# Patient Record
Sex: FEMALE | Race: WHITE | NOT HISPANIC OR LATINO | Employment: OTHER | URBAN - NONMETROPOLITAN AREA
[De-identification: names, ages, dates, MRNs, and addresses within clinical notes are randomized per-mention and may not be internally consistent; named-entity substitution may affect disease eponyms.]

---

## 2024-03-08 ENCOUNTER — HOSPITAL ENCOUNTER (INPATIENT)
Facility: HOSPITAL | Age: 29
LOS: 1 days | Discharge: HOME | End: 2024-03-09
Attending: OBSTETRICS & GYNECOLOGY | Admitting: OBSTETRICS & GYNECOLOGY
Payer: COMMERCIAL

## 2024-03-08 PROBLEM — Z36.89 ENCOUNTER FOR TRIAGE IN PREGNANT PATIENT: Status: ACTIVE | Noted: 2024-03-08

## 2024-03-08 LAB
ABO/RH(D): NORMAL
ABO/RH(D): NORMAL
ANTIBODY SCREEN: NEGATIVE
ERYTHROCYTE [DISTWIDTH] IN BLOOD BY AUTOMATED COUNT: 14.2 % (ref 10–15)
FETAL BLEED SCREEN: NORMAL
HCT VFR BLD AUTO: 30.7 % (ref 35–47)
HGB BLD-MCNC: 10.3 G/DL (ref 11.7–15.7)
HOLD SPECIMEN: NORMAL
MCH RBC QN AUTO: 28.8 PG (ref 26.5–33)
MCHC RBC AUTO-ENTMCNC: 33.6 G/DL (ref 31.5–36.5)
MCV RBC AUTO: 86 FL (ref 78–100)
PLATELET # BLD AUTO: 184 10E3/UL (ref 150–450)
RBC # BLD AUTO: 3.58 10E6/UL (ref 3.8–5.2)
SPECIMEN EXPIRATION DATE: NORMAL
SPECIMEN EXPIRATION DATE: NORMAL
WBC # BLD AUTO: 17.6 10E3/UL (ref 4–11)

## 2024-03-08 PROCEDURE — 86900 BLOOD TYPING SEROLOGIC ABO: CPT | Performed by: OBSTETRICS & GYNECOLOGY

## 2024-03-08 PROCEDURE — 258N000003 HC RX IP 258 OP 636: Performed by: OBSTETRICS & GYNECOLOGY

## 2024-03-08 PROCEDURE — 250N000011 HC RX IP 250 OP 636: Mod: JZ | Performed by: OBSTETRICS & GYNECOLOGY

## 2024-03-08 PROCEDURE — 250N000009 HC RX 250: Performed by: OBSTETRICS & GYNECOLOGY

## 2024-03-08 PROCEDURE — 85461 HEMOGLOBIN FETAL: CPT | Performed by: OBSTETRICS & GYNECOLOGY

## 2024-03-08 PROCEDURE — 85018 HEMOGLOBIN: CPT | Performed by: OBSTETRICS & GYNECOLOGY

## 2024-03-08 PROCEDURE — 99222 1ST HOSP IP/OBS MODERATE 55: CPT | Performed by: OBSTETRICS & GYNECOLOGY

## 2024-03-08 PROCEDURE — 36415 COLL VENOUS BLD VENIPUNCTURE: CPT | Performed by: OBSTETRICS & GYNECOLOGY

## 2024-03-08 RX ORDER — TRANEXAMIC ACID 10 MG/ML
1 INJECTION, SOLUTION INTRAVENOUS ONCE
Status: COMPLETED | OUTPATIENT
Start: 2024-03-08 | End: 2024-03-08

## 2024-03-08 RX ORDER — LIDOCAINE 40 MG/G
CREAM TOPICAL
Status: DISCONTINUED | OUTPATIENT
Start: 2024-03-08 | End: 2024-03-09 | Stop reason: HOSPADM

## 2024-03-08 RX ADMIN — SODIUM CHLORIDE, POTASSIUM CHLORIDE, SODIUM LACTATE AND CALCIUM CHLORIDE 1000 ML: 600; 310; 30; 20 INJECTION, SOLUTION INTRAVENOUS at 22:00

## 2024-03-08 RX ADMIN — TRANEXAMIC ACID 1 G: 10 INJECTION, SOLUTION INTRAVENOUS at 20:55

## 2024-03-08 RX ADMIN — HUMAN RHO(D) IMMUNE GLOBULIN 300 MCG: 1500 SOLUTION INTRAMUSCULAR; INTRAVENOUS at 23:39

## 2024-03-08 ASSESSMENT — ACTIVITIES OF DAILY LIVING (ADL)
ADLS_ACUITY_SCORE: 18
ADLS_ACUITY_SCORE: 21
ADLS_ACUITY_SCORE: 18

## 2024-03-09 ENCOUNTER — HOSPITAL ENCOUNTER (INPATIENT)
Facility: HOSPITAL | Age: 29
End: 2024-03-09
Admitting: OBSTETRICS & GYNECOLOGY
Payer: COMMERCIAL

## 2024-03-09 VITALS
HEIGHT: 67 IN | BODY MASS INDEX: 39.05 KG/M2 | HEART RATE: 91 BPM | WEIGHT: 248.8 LBS | SYSTOLIC BLOOD PRESSURE: 117 MMHG | OXYGEN SATURATION: 99 % | DIASTOLIC BLOOD PRESSURE: 64 MMHG | RESPIRATION RATE: 20 BRPM | TEMPERATURE: 98.3 F

## 2024-03-09 LAB
BASOPHILS # BLD AUTO: 0 10E3/UL (ref 0–0.2)
BASOPHILS NFR BLD AUTO: 0 %
EOSINOPHIL # BLD AUTO: 0 10E3/UL (ref 0–0.7)
EOSINOPHIL NFR BLD AUTO: 0 %
ERYTHROCYTE [DISTWIDTH] IN BLOOD BY AUTOMATED COUNT: 14.3 % (ref 10–15)
EST. AVERAGE GLUCOSE BLD GHB EST-MCNC: 105 MG/DL
HBA1C MFR BLD: 5.3 %
HBV SURFACE AG SERPL QL IA: NONREACTIVE
HCT VFR BLD AUTO: 25.2 % (ref 35–47)
HGB BLD-MCNC: 8.5 G/DL (ref 11.7–15.7)
HIV 1+2 AB+HIV1 P24 AG SERPL QL IA: NONREACTIVE
IMM GRANULOCYTES # BLD: 0.1 10E3/UL
IMM GRANULOCYTES NFR BLD: 1 %
LYMPHOCYTES # BLD AUTO: 3.6 10E3/UL (ref 0–5.3)
LYMPHOCYTES NFR BLD AUTO: 24 %
MCH RBC QN AUTO: 29 PG (ref 26.5–33)
MCHC RBC AUTO-ENTMCNC: 33.7 G/DL (ref 31.5–36.5)
MCV RBC AUTO: 86 FL (ref 78–100)
MONOCYTES # BLD AUTO: 1.4 10E3/UL (ref 0–1.3)
MONOCYTES NFR BLD AUTO: 10 %
NEUTROPHILS # BLD AUTO: 9.7 10E3/UL (ref 1.6–8.3)
NEUTROPHILS NFR BLD AUTO: 65 %
NRBC # BLD AUTO: 0 10E3/UL
NRBC BLD AUTO-RTO: 0 /100
PLATELET # BLD AUTO: 142 10E3/UL (ref 150–450)
RBC # BLD AUTO: 2.93 10E6/UL (ref 3.8–5.2)
T PALLIDUM AB SER QL: NONREACTIVE
WBC # BLD AUTO: 15 10E3/UL (ref 4–11)

## 2024-03-09 PROCEDURE — 85048 AUTOMATED LEUKOCYTE COUNT: CPT | Performed by: OBSTETRICS & GYNECOLOGY

## 2024-03-09 PROCEDURE — 250N000011 HC RX IP 250 OP 636: Performed by: OBSTETRICS & GYNECOLOGY

## 2024-03-09 PROCEDURE — 99238 HOSP IP/OBS DSCHRG MGMT 30/<: CPT | Performed by: OBSTETRICS & GYNECOLOGY

## 2024-03-09 PROCEDURE — 258N000003 HC RX IP 258 OP 636: Performed by: OBSTETRICS & GYNECOLOGY

## 2024-03-09 PROCEDURE — 83036 HEMOGLOBIN GLYCOSYLATED A1C: CPT | Performed by: OBSTETRICS & GYNECOLOGY

## 2024-03-09 PROCEDURE — 120N000001 HC R&B MED SURG/OB

## 2024-03-09 PROCEDURE — 87340 HEPATITIS B SURFACE AG IA: CPT | Performed by: OBSTETRICS & GYNECOLOGY

## 2024-03-09 PROCEDURE — 86762 RUBELLA ANTIBODY: CPT | Performed by: OBSTETRICS & GYNECOLOGY

## 2024-03-09 PROCEDURE — 87389 HIV-1 AG W/HIV-1&-2 AB AG IA: CPT | Performed by: OBSTETRICS & GYNECOLOGY

## 2024-03-09 PROCEDURE — 86780 TREPONEMA PALLIDUM: CPT | Performed by: OBSTETRICS & GYNECOLOGY

## 2024-03-09 PROCEDURE — 36415 COLL VENOUS BLD VENIPUNCTURE: CPT | Performed by: OBSTETRICS & GYNECOLOGY

## 2024-03-09 RX ORDER — DIPHENHYDRAMINE HYDROCHLORIDE 50 MG/ML
50 INJECTION INTRAMUSCULAR; INTRAVENOUS
Status: DISCONTINUED | OUTPATIENT
Start: 2024-03-09 | End: 2024-03-09 | Stop reason: HOSPADM

## 2024-03-09 RX ORDER — OXYTOCIN/0.9 % SODIUM CHLORIDE 30/500 ML
340 PLASTIC BAG, INJECTION (ML) INTRAVENOUS CONTINUOUS PRN
Status: DISCONTINUED | OUTPATIENT
Start: 2024-03-09 | End: 2024-03-09 | Stop reason: HOSPADM

## 2024-03-09 RX ORDER — ACETAMINOPHEN 325 MG/1
650 TABLET ORAL EVERY 4 HOURS PRN
Status: DISCONTINUED | OUTPATIENT
Start: 2024-03-09 | End: 2024-03-09 | Stop reason: HOSPADM

## 2024-03-09 RX ORDER — METOCLOPRAMIDE HYDROCHLORIDE 5 MG/ML
10 INJECTION INTRAMUSCULAR; INTRAVENOUS EVERY 6 HOURS PRN
Status: DISCONTINUED | OUTPATIENT
Start: 2024-03-09 | End: 2024-03-09 | Stop reason: HOSPADM

## 2024-03-09 RX ORDER — PROCHLORPERAZINE MALEATE 10 MG
10 TABLET ORAL EVERY 6 HOURS PRN
Status: DISCONTINUED | OUTPATIENT
Start: 2024-03-09 | End: 2024-03-09 | Stop reason: HOSPADM

## 2024-03-09 RX ORDER — ONDANSETRON 2 MG/ML
4 INJECTION INTRAMUSCULAR; INTRAVENOUS EVERY 6 HOURS PRN
Status: DISCONTINUED | OUTPATIENT
Start: 2024-03-09 | End: 2024-03-09 | Stop reason: HOSPADM

## 2024-03-09 RX ORDER — FENTANYL CITRATE 50 UG/ML
50 INJECTION, SOLUTION INTRAMUSCULAR; INTRAVENOUS EVERY 30 MIN PRN
Status: DISCONTINUED | OUTPATIENT
Start: 2024-03-09 | End: 2024-03-09 | Stop reason: HOSPADM

## 2024-03-09 RX ORDER — METHYLERGONOVINE MALEATE 0.2 MG/ML
200 INJECTION INTRAVENOUS
Status: DISCONTINUED | OUTPATIENT
Start: 2024-03-09 | End: 2024-03-09 | Stop reason: HOSPADM

## 2024-03-09 RX ORDER — METHYLPREDNISOLONE SODIUM SUCCINATE 125 MG/2ML
125 INJECTION, POWDER, LYOPHILIZED, FOR SOLUTION INTRAMUSCULAR; INTRAVENOUS
Status: DISCONTINUED | OUTPATIENT
Start: 2024-03-09 | End: 2024-03-09 | Stop reason: HOSPADM

## 2024-03-09 RX ORDER — TRANEXAMIC ACID 10 MG/ML
1 INJECTION, SOLUTION INTRAVENOUS EVERY 30 MIN PRN
Status: DISCONTINUED | OUTPATIENT
Start: 2024-03-09 | End: 2024-03-09 | Stop reason: HOSPADM

## 2024-03-09 RX ORDER — LOPERAMIDE HCL 2 MG
4 CAPSULE ORAL
Status: DISCONTINUED | OUTPATIENT
Start: 2024-03-09 | End: 2024-03-09 | Stop reason: HOSPADM

## 2024-03-09 RX ORDER — NALOXONE HYDROCHLORIDE 0.4 MG/ML
0.2 INJECTION, SOLUTION INTRAMUSCULAR; INTRAVENOUS; SUBCUTANEOUS
Status: DISCONTINUED | OUTPATIENT
Start: 2024-03-09 | End: 2024-03-09 | Stop reason: HOSPADM

## 2024-03-09 RX ORDER — LOPERAMIDE HCL 2 MG
2 CAPSULE ORAL
Status: DISCONTINUED | OUTPATIENT
Start: 2024-03-09 | End: 2024-03-09 | Stop reason: HOSPADM

## 2024-03-09 RX ORDER — NALOXONE HYDROCHLORIDE 0.4 MG/ML
0.4 INJECTION, SOLUTION INTRAMUSCULAR; INTRAVENOUS; SUBCUTANEOUS
Status: DISCONTINUED | OUTPATIENT
Start: 2024-03-09 | End: 2024-03-09 | Stop reason: HOSPADM

## 2024-03-09 RX ORDER — KETOROLAC TROMETHAMINE 30 MG/ML
30 INJECTION, SOLUTION INTRAMUSCULAR; INTRAVENOUS
Status: DISCONTINUED | OUTPATIENT
Start: 2024-03-09 | End: 2024-03-09 | Stop reason: HOSPADM

## 2024-03-09 RX ORDER — CARBOPROST TROMETHAMINE 250 UG/ML
250 INJECTION, SOLUTION INTRAMUSCULAR
Status: DISCONTINUED | OUTPATIENT
Start: 2024-03-09 | End: 2024-03-09 | Stop reason: HOSPADM

## 2024-03-09 RX ORDER — OXYTOCIN 10 [USP'U]/ML
10 INJECTION, SOLUTION INTRAMUSCULAR; INTRAVENOUS
Status: DISCONTINUED | OUTPATIENT
Start: 2024-03-09 | End: 2024-03-09 | Stop reason: HOSPADM

## 2024-03-09 RX ORDER — IBUPROFEN 800 MG/1
800 TABLET, FILM COATED ORAL
Status: DISCONTINUED | OUTPATIENT
Start: 2024-03-09 | End: 2024-03-09 | Stop reason: HOSPADM

## 2024-03-09 RX ORDER — ONDANSETRON 4 MG/1
4 TABLET, ORALLY DISINTEGRATING ORAL EVERY 6 HOURS PRN
Status: DISCONTINUED | OUTPATIENT
Start: 2024-03-09 | End: 2024-03-09 | Stop reason: HOSPADM

## 2024-03-09 RX ORDER — METOCLOPRAMIDE 10 MG/1
10 TABLET ORAL EVERY 6 HOURS PRN
Status: DISCONTINUED | OUTPATIENT
Start: 2024-03-09 | End: 2024-03-09 | Stop reason: HOSPADM

## 2024-03-09 RX ORDER — PROCHLORPERAZINE 25 MG
25 SUPPOSITORY, RECTAL RECTAL EVERY 12 HOURS PRN
Status: DISCONTINUED | OUTPATIENT
Start: 2024-03-09 | End: 2024-03-09 | Stop reason: HOSPADM

## 2024-03-09 RX ORDER — MISOPROSTOL 200 UG/1
400 TABLET ORAL
Status: DISCONTINUED | OUTPATIENT
Start: 2024-03-09 | End: 2024-03-09 | Stop reason: HOSPADM

## 2024-03-09 RX ADMIN — IRON SUCROSE 300 MG: 20 INJECTION, SOLUTION INTRAVENOUS at 08:39

## 2024-03-09 ASSESSMENT — ACTIVITIES OF DAILY LIVING (ADL)
ADLS_ACUITY_SCORE: 21
ADLS_ACUITY_SCORE: 23
ADLS_ACUITY_SCORE: 23
ADLS_ACUITY_SCORE: 22
ADLS_ACUITY_SCORE: 21
ADLS_ACUITY_SCORE: 20
ADLS_ACUITY_SCORE: 21
ADLS_ACUITY_SCORE: 22
ADLS_ACUITY_SCORE: 21
ADLS_ACUITY_SCORE: 21

## 2024-03-09 NOTE — PLAN OF CARE
Patient spontaneously voided 420 mL at 1020 and she had some unmeasured urine output in the toilet duye to missing to hat. 1 pea-sized and 1 cherry tomato sized clot noted. Lochia rubra and light. Fundus firm, U, and to the left of the umbilicus. Post-void bladder scan 537 mL. Dr. Arrington updated on patient's voiding status and fundus. MD stated to discharge patient after spontaneously voiding >200 mL one more time.

## 2024-03-09 NOTE — PROGRESS NOTES
Pt feels like she is unable to empty bladder. Was able to void at least x2 last evening. Fundal assessment +3/U and deviates to the left. Bladder scan for >999. Dr. Arrington notified. Order to straight cath.   Daysi Briscoe RN on 3/9/2024 at 4:51 AM

## 2024-03-09 NOTE — PROGRESS NOTES
Pt up to bathroom, briefly passed out on toilet. Assistance called. Dr. Arrington called back to room.

## 2024-03-09 NOTE — PLAN OF CARE
Assessments completed as charted. B/P: 118/67, T: 98.2, P: 120, R: 22. Able to void last evening, but not able to void overnight. Fundus at that point 3-4 above U and deviated to the left. Bladder scan at 0500 for >999, straight cath at 0520 for 1150 mL clear yellow urine. Fundus: Now midline @ U. Lochia: Light. Activity: unrestricted with out pain . Infant feeding: Breast feeding going well.       Postpartum care education provided, see Patient Education activity. Patient denies needs. Will monitor.  Daysi Briscoe RN

## 2024-03-09 NOTE — PROGRESS NOTES
Pt arrived via EMS after home birth and PPH. Had been given IM pitocin at home by home birth midwife. Delivered 3/8/24 at 1759. Upon arrival VS :/67   Temp 98.9  F (37.2  C) (Oral)   Resp 22  pulse 129. Fundus firm +1/U. Bleeding moderate. Accompanied by home midwife.

## 2024-03-09 NOTE — H&P
OB Labor and Delivery History and Physical    Name: Indy Beavers    Age: 28 year old    YOB: 1995   Medical Record #: [id number]     Date of Admission:  3/8/2024  Admitting Service:  Obstetrics and Gynecology  Primary Provider:   No primary care provider on file.    DATE: 3/8/2024         Chief Complaint:   Indy Beavers is a 28 year old  female who presents via ambulance for hemorrhage following home birth. She is accompanied by her midwife, John, who also provides much of the story.         History of Present Illness:   Normal pregnancy, normal vaginal delivery around 6pm tonight. EBL around 1600 ccs. Placenta delivered easily, membranes trailed. John was able to remove most of the membranes with a piece of gauze during a vaginal exam, but he is not certain that everything came out. He administered 20U IM pitocin and 400 mcg misoprostol orally.    She arrived via ambulance. Pulse was elevated to 125 on admission, improved to 106 within minutes. No significant bleeding with firm fundal massage.            Review of Systems:   As per the HPI. Other systems are reviewed and negative.           Allergies:   No Known Allergies           Medication Prior to Admission:     No medications prior to admission.              Active Problem List:     Patient Active Problem List   Diagnosis    Encounter for triage in pregnant patient            Past Medical History:   No past medical history on file.  Healthy, no prior hospitalizations         Past Surgical History:   No past surgical history on file.  denies           Obstetric History:   EDC: Estimated Date of Delivery: Data Unavailable  OB History    Para Term  AB Living   1 0 0 0 0 0   SAB IAB Ectopic Multiple Live Births   0 0 0 0 0      # Outcome Date GA Lbr Hang/2nd Weight Sex Delivery Anes PTL Lv   1 Current                      Family History:   Unchanged from prenatal record.           Social History:     Social History      Tobacco Use    Smoking status: Not on file    Smokeless tobacco: Not on file   Substance Use Topics    Alcohol use: Not on file     History   Sexual Activity    Sexual activity: Not on file              Physical Exam:   Vital signs:  /67   Temp 98.9  F (37.2  C) (Oral)     General: Alert and oriented. Anxious.  Abdomen: obese, soft. FF @ U.  : dry blood present on legs and perineum, but no significant active bleeding          Diagnostics:   PRENATAL LABS/RADIOLOGY:  A negative per report  Hgb was over 13 two weeks ago per midwife report    She has not received routine prenatal labs and declines them at this time.            Assessment/Plan:   28 year old  3 hours postpartum following home birth admitted with postpartum hemorrhage. Stable with minimal bleeding.    IVFs. She has received one bag, we will hang another.  TXA. She has already received 20U pitocin IM and 400 mcg misoprostol PO.  Labs: CBC with platelets and type/screen. Will hold on other labs at this point as her bleeding appears to be under control. Declines additional prenatal labs.  Antibiotics not indicated at this point, no intrauterine exam was performed.  Midwife and patient request that rhogam be given while they are here. She has not received rhogam earlier in the pregnancy.  She desires minimal intervention.   We will follow her vital signs, monitor her bleeding, and await lab results. If stable, she may be discharged with bleeding precautions. Discussed that there may be additional membranes inside that could cause bleeding down the road and recommend that she returns urgently for evaluation if her bleeding begins to increase, she experiences fever, or other concerns arise.      Addendum:  Patient passed out on the toilet. Bleeding continues to be stable. Hgb 10.3. VSS. We will keep her for observation overnight.      Heidy Arrington MD  Obstetrics and Gynecology

## 2024-03-09 NOTE — PLAN OF CARE
Data: Vital signs within normal limits. Postpartum checks within normal limits - see flow record. Patient eating and drinking normally. Patient took a shower this morning to aid in bladder relaxation to encourage spontaneous voiding. Patient was able to void a small amount in the toilet afterwards. Unknown volume due to urine missing hat in toilet. Bladder scan volume 432 mL. Dr. Nury sy. MD stated to allow 1-2 hours for patient to void spontaneously, instead of straight catheterization, and to notify if patient is unable to void on her own within timeframe. Patient able to breastfeed infant, perform self cares, and ambulate independently. Patient instructed to report lightheadedness or dizziness and to call for assistance before moving.  Action: Patient denied pain. Patient education done about postpartum urinary retention. See flow record.  Response: Positive attachment behaviors observed with infant.  Zach present and caring for infant in room.  Plan: Anticipate discharge today.

## 2024-03-09 NOTE — DISCHARGE INSTRUCTIONS
Warning Signs after Having a Baby    Keep this paper on your fridge or somewhere else where you can see it.    Call your provider if you have any of these symptoms up to 12 weeks after having your baby.    Thoughts of hurting yourself or your baby  Pain in your chest or trouble breathing  Severe headache not helped by pain medicine  Eyesight concerns (blurry vision, seeing spots or flashes of light, other changes to eyesight)  Fainting, shaking or other signs of a seizure    Call 9-1-1 if you feel that it is an emergency.     The symptoms below can happen to anyone after giving birth. They can be very serious. Call your provider if you have any of these warning signs.    My provider s phone number: _______________________    Losing too much blood (hemorrhage)    Call your provider if you soak through a pad in less than an hour or pass blood clots bigger than a golf ball. These may be signs that you are bleeding too much.    Blood clots in the legs or lungs    After you give birth, your body naturally clots its blood to help prevent blood loss. Sometimes this increased clotting can happen in other areas of the body, like the legs or lungs. This can block your blood flow and be very dangerous.     Call your provider if you:  Have a red, swollen spot on the back of your leg that is warm or painful when you touch it.   Are coughing up blood.     Infection    Call your provider if you have any of these symptoms:  Fever of 100.4 F (38 C) or higher.  Pain or redness around your stitches if you had an incision.   Any yellow, white, or green fluid coming from places where you had stitches or surgery.    Mood Problems (postpartum depression)    Many people feel sad or have mood changes after having a baby. But for some people, these mood swings are worse.     Call your provider right away if you feel so anxious or nervous that you can't care for yourself or your baby.    Preeclampsia (high blood pressure)    Even if you  didn't have high blood pressure when you were pregnant, you are at risk for the high blood pressure disease called preeclampsia. This risk can last up to 12 weeks after giving birth.     Call your provider if you have:   Pain on your right side under your rib cage  Sudden swelling in the hands and face    Remember: You know your body. If something doesn't feel right, get medical help.     For informational purposes only. Not to replace the advice of your health care provider. Copyright 2020 [facility]. All rights reserved. Clinically reviewed by Alessia Babb, RNC-OB, MSN. [facility] [id number] - Rev 02/23.

## 2024-03-09 NOTE — PROGRESS NOTES
"Postpartum Progress Note      Name: Indy Maldonado    Age: 28 year old    YOB: 1995   Medical Record #: [id number]       DATE: 3/9/2024  Postpartum Day 1      SUBJECTIVE:  Having a hard time voiding.Catheterized for 1100ccs. Still having a hard time 2 hours later with 400ccs on bladder scan.  Pain is well controlled.  Lochia is decreasing.  Tolerating PO without nausea/vomiting.  Ambulating without SOB or dizziness.  Off to a good start with breastfeeding.       OBJECTIVE:  /59 (BP Location: Right arm, Patient Position: Supine, Cuff Size: Adult Regular)   Pulse 120   Temp 98.3  F (36.8  C) (Oral)   Resp 20   Ht 1.702 m (5' 7\")   SpO2 98%     NAD       LABS :  Hemoglobin   Date Value Ref Range Status   2024 8.5 (L) 11.7 - 15.7 g/dL Final       Medications reviewed      Assesment/Plan:   28 year old  on PPD #1 s/p postpartum hemorrhage following home delivery, now experiencing urinary retention. Bleeding is minimal.  Postpartum cares: routine  Rh negative, Rubella pending  She will continue to attempt to void on her own using the shower and bath. We will give her an hour or two, then bladder scan to ensure adequate emptying. If she is unable to void, we will repeat straight cath and reevaluate our plan.  If she is able to void, ok to discharge. Follow up with John, her midwife. Expressed that we are available if she needs anything.         Heidy Arrington MD  Obstetrics and Gynecology    "

## 2024-03-09 NOTE — PLAN OF CARE
Patient discharged at 12:20 PM via wheel chair accompanied by spouse and staff. Prescriptions - None ordered for discharge. All belongings sent with patient.     Discharge instructions reviewed with patient. Listed belongings gathered and returned to patient.    Patient discharged to home.     Pneumonia and Influenza given prior to discharge, if indicated: No    MISC  Follow up appointment made:   Patient to follow up with midwife.  Home and hospital aquired medications returned to patient: N/A  Patient reports pain was well managed at discharge: Yes

## 2024-03-09 NOTE — DISCHARGE SUMMARY
OB DISCHARGE SUMMARY      Name: Indy Maldonado    Age: 28 year old    YOB: 1995   Medical Record #: [id number]       Date of admission: 3/8/2024  Date of discharge: 3/9/2024      Narrative Summary: 28 year old  who presented at 3 hours postpartum following home birth complicated by postpartum hemorrhage of 1600 ccs per midwife estimate. She received 20U pitocin and 400 mcg misoprostol po at home. At the time of admission her bleeding had essentially stopped. She was given IVFs during transport and a second bag after admission along with TXA. She did experience an episode of syncope while attempting to stool but recovered quickly when back to bed. Overnight she experienced urinary retention and was straight catheterized for 1100 ccs. The following morning she continued to struggle to void spontaneously, but was finally able to accomplish this on her own. Hgb dropped from 13 two weeks ago to 8.5 on PPD 1. IV iron discussed and administered at patient request. Rhogam administered.       Labs:  Hemoglobin   Date Value Ref Range Status   2024 8.5 (L) 11.7 - 15.7 g/dL Final   Hgb A1C 5.3      Discharged to home on PPD 1 with pain controlled without medications, tolerating po, voiding spontaneously, ambulating.    Rh negative, rubulla pending.    Final diagnosis:   Postpartum hemorrhage      Operations/procedures: none  Complications: none  Consults: none  Condition on discharge: good    Discharge Medications:     Review of your medicines      You have not been prescribed any medications.          Discharge to home.  Ibuprofen and tylenol as needed for pain.  Breastfeeding support available if needed.  Follow up with midwife. We are available if needed.      Hediy Arrington MD  Obstetrics and Gynecology

## 2024-03-11 LAB
RUBV IGG SERPL QL IA: <0.1 INDEX
RUBV IGG SERPL QL IA: NORMAL

## 2024-03-14 ENCOUNTER — HOSPITAL ENCOUNTER (EMERGENCY)
Facility: HOSPITAL | Age: 29
Discharge: HOME | End: 2024-03-14
Attending: STUDENT IN AN ORGANIZED HEALTH CARE EDUCATION/TRAINING PROGRAM | Admitting: STUDENT IN AN ORGANIZED HEALTH CARE EDUCATION/TRAINING PROGRAM
Payer: COMMERCIAL

## 2024-03-14 VITALS
DIASTOLIC BLOOD PRESSURE: 83 MMHG | OXYGEN SATURATION: 100 % | HEART RATE: 91 BPM | WEIGHT: 241.18 LBS | SYSTOLIC BLOOD PRESSURE: 130 MMHG | TEMPERATURE: 98.4 F | BODY MASS INDEX: 37.77 KG/M2 | RESPIRATION RATE: 16 BRPM

## 2024-03-14 DIAGNOSIS — N10 PYELONEPHRITIS, ACUTE: ICD-10-CM

## 2024-03-14 LAB
ALBUMIN UR-MCNC: 70 MG/DL
APPEARANCE UR: ABNORMAL
BACTERIA #/AREA URNS HPF: ABNORMAL /HPF
BILIRUB UR QL STRIP: NEGATIVE
COLOR UR AUTO: ABNORMAL
GLUCOSE UR STRIP-MCNC: NEGATIVE MG/DL
HGB UR QL STRIP: ABNORMAL
HYALINE CASTS: 4 /LPF
KETONES UR STRIP-MCNC: NEGATIVE MG/DL
LEUKOCYTE ESTERASE UR QL STRIP: ABNORMAL
MUCOUS THREADS #/AREA URNS LPF: PRESENT /LPF
NITRATE UR QL: NEGATIVE
PH UR STRIP: 8 [PH] (ref 4.7–8)
RBC URINE: 16 /HPF
SP GR UR STRIP: 1.01 (ref 1–1.03)
SQUAMOUS EPITHELIAL: 15 /HPF
UROBILINOGEN UR STRIP-MCNC: NORMAL MG/DL
WBC CLUMPS #/AREA URNS HPF: PRESENT /HPF
WBC URINE: 146 /HPF

## 2024-03-14 PROCEDURE — 99283 EMERGENCY DEPT VISIT LOW MDM: CPT

## 2024-03-14 PROCEDURE — 99283 EMERGENCY DEPT VISIT LOW MDM: CPT | Performed by: STUDENT IN AN ORGANIZED HEALTH CARE EDUCATION/TRAINING PROGRAM

## 2024-03-14 PROCEDURE — 81001 URINALYSIS AUTO W/SCOPE: CPT | Performed by: STUDENT IN AN ORGANIZED HEALTH CARE EDUCATION/TRAINING PROGRAM

## 2024-03-14 RX ORDER — CEPHALEXIN 750 MG/1
750 CAPSULE ORAL 4 TIMES DAILY
Qty: 56 CAPSULE | Refills: 0 | Status: SHIPPED | OUTPATIENT
Start: 2024-03-14 | End: 2024-03-28

## 2024-03-14 ASSESSMENT — COLUMBIA-SUICIDE SEVERITY RATING SCALE - C-SSRS
1. IN THE PAST MONTH, HAVE YOU WISHED YOU WERE DEAD OR WISHED YOU COULD GO TO SLEEP AND NOT WAKE UP?: NO
6. HAVE YOU EVER DONE ANYTHING, STARTED TO DO ANYTHING, OR PREPARED TO DO ANYTHING TO END YOUR LIFE?: NO
2. HAVE YOU ACTUALLY HAD ANY THOUGHTS OF KILLING YOURSELF IN THE PAST MONTH?: NO

## 2024-03-14 ASSESSMENT — ACTIVITIES OF DAILY LIVING (ADL): ADLS_ACUITY_SCORE: 37

## 2024-03-14 NOTE — ED PROVIDER NOTES
[facility]  ED Provider Note    Chief Complaint   Patient presents with    Flank Pain     History:  Indy Maldonado is a 28 year old female with status post home vaginal delivery 1 week prior to arrival with subsequent required suction for removal of retained products who presents to the emergency department today complaining of discomfort while urinating as well as left lower quadrant and left flank pain.  No nausea no vomiting.  Some feeling of fevers and chills subjectively.  No other complaints this time.    Review of Systems   Performed; see HPI for pertinent positives and negatives.     Medical history, surgical history, and social history was reviewed.  Nursing documentation, triage note, and vitals were reviewed.    Vitals:  BP: 130/83  Pulse: 91  Temp: 98.2  F (36.8  C)  Resp: 16  Weight: 109.4 kg (241 lb 2.9 oz)  SpO2: 100 %    Physical Exam:  Constitutional: Alert and conversant. NAD   HENT: NCAT   Eyes: Normal pupils   Neck: supple   CV: No pallor  Pulmonary/Chest: Non-labored respirations  Abdominal: non-distended fundus difficult to clearly palpate given patient habitus but certainly no significant suprapubic tenderness no rebound no guarding no tenderness anywhere in the abdomen.  No CVA tenderness.  MSK: ARCHIBALD.   Neuro: Alert and appropriate   Skin: Warm and dry. No diaphoresis. No rashes on exposed skin    Psych: Appropriate mood and affect       MDM:      ED Course as of 03/14/24 0934   Thu Mar 14, 2024   0857 Indy Maldonado is a 28 year old female presenting with urinary symptoms and flank pain  Differential diagnosis includes but is not limited to urinary tract infection, hemorrhagic cystitis, pyelonephritis, urolithiasis, infected urolithiasis, PID, TOA, ovarian torsion, sexually transmitted disease, biliary pathology, appendicitis, diverticulitis, AAA/aortic dissection, PNA,   Reassuring vitals.   UA suggests infection in the urinary tract which, with associated  symptoms, suggests pyelonephritis. Sexually transmitted disease testing is not indicated at this time with lack of abnormal discharge and more likely diagnosis with consistent symptoms and laboratory findings.      Given the patient's symptoms, presentation and hx along with the laboratory findings, presentation is most consistent with acute pyelonephritis. No hx to strongly support urolithiasis or ovarian pathology and with the UTI on labs, these are less likely. Abdominal exam is benign, doubt surgical or catastrophic intraabdominal process. Given comorbidities, intravenous antibiotics not indicated. Symptoms controlled without intervention.    Dispo outpatient       Procedures:  Procedures    Impression:  Final diagnoses:   Pyelonephritis, acute            David Blair MD  03/14/24 [id number]

## 2024-03-14 NOTE — ED NOTES
Discharge instructions reviewed with patient. Rx has been e-scribed to pharmacy of choice. Encouraged to return with new or worsening symptoms. Pt declined discharged vitals.  Ambulated out of ED with family member gait steady.

## 2024-03-14 NOTE — ED TRIAGE NOTES
Pt presents with c/o left sided flank pain that began at 0600 today, pain wrapped from her left kidney to her bladder and into her buttocks. Pt did give birth 1 week ago, denies ever having a kidney stone.

## 2024-03-14 NOTE — ED NOTES
Pt presents with reports of having cramping around abdomen to back and now staying in left lower quadrant.   Had home birth last Friday born in the evening and came in due post partum hemorrhaging after the birth .  Didn't receive blood transfusion.  Bleeding stopped prior to getting to the hospital.  This is pt first home birth. First birth was done in the hospital and no complications.  No fever. Reports felt some chills last night.  Some nausea with the pain.  Denies dysuria. No diarrhea.

## 2024-03-14 NOTE — DISCHARGE INSTRUCTIONS
Return to emergency room if worsening symptoms or new concerning symptoms.  Follow-up with primary care provider within the next week.  Call to schedule appointment.  Take the antibiotics as directed.  Complete the entire regimen even if you are feeling better